# Patient Record
Sex: FEMALE | Race: WHITE | NOT HISPANIC OR LATINO | Employment: OTHER | URBAN - METROPOLITAN AREA
[De-identification: names, ages, dates, MRNs, and addresses within clinical notes are randomized per-mention and may not be internally consistent; named-entity substitution may affect disease eponyms.]

---

## 2018-03-06 ENCOUNTER — TELEPHONE (OUTPATIENT)
Dept: SPINE | Facility: CLINIC | Age: 58
End: 2018-03-06

## 2018-03-06 DIAGNOSIS — M41.9 SCOLIOSIS, UNSPECIFIED SCOLIOSIS TYPE, UNSPECIFIED SPINAL REGION: Primary | ICD-10-CM

## 2018-03-06 NOTE — PROGRESS NOTES
"Subjective:      Patient ID: Johanny Najera is a 57 y.o. female.    Chief Complaint: Scoliosis      HPI     History of fibromyalgia.     History of chronic intermittent LBP that has been worse in last 2 months.     She complains of constant LBP with bilateral posterior leg pain to her feet. LBP > leg pain. Left leg pain > right leg pain. No numbness. She has tingling and weakness in her legs. She walks like an "old lady." Leg pain is better with standing and walking. Some improvement in LBP with stretching and yoga. Pain is worse with prolonged sitting and standing. Pain is worse in morning with some improvement as day progresses. Pain is burning. She rates her pain as a 4 on a scale of 1-10. No known injury.     Some improvement with yoga on her own. No PT, injections, or surgery on her back. She is on zanaflex and lyrica for FM- not sure this helps her back. No relief with OTC tylenol.     Patient denies fevers, chills, night sweats, nausea, vomiting, and weight loss. Patient also denies bowel/bladder dysfunction, sexual dysfunction, and any saddle anesthesia. She denies balance issues, changes in handwriting, problems with hand dexterity, and frequent dropping of items.      Review of Systems   Constitution: Negative for fever, weakness, malaise/fatigue, night sweats, weight gain and weight loss.   HENT: Negative for hearing loss, nosebleeds and odynophagia.    Eyes: Negative for blurred vision and double vision.   Cardiovascular: Negative for chest pain, irregular heartbeat and palpitations.   Respiratory: Negative for cough, hemoptysis, shortness of breath and wheezing.    Endocrine: Negative for cold intolerance and polydipsia.   Hematologic/Lymphatic: Does not bruise/bleed easily.   Skin: Negative for dry skin, poor wound healing, rash and suspicious lesions.   Musculoskeletal: Positive for back pain and neck pain.        See HPI for pertinent positives.   Gastrointestinal: Negative for bloating, abdominal " pain, constipation, diarrhea, hematochezia, melena, nausea and vomiting.   Genitourinary: Negative for bladder incontinence, dysuria, hematuria, hesitancy and incomplete emptying.   Neurological: Positive for paresthesias. Negative for disturbances in coordination, dizziness, focal weakness, headaches, loss of balance, numbness and seizures.   Psychiatric/Behavioral: Positive for depression. Negative for hallucinations. The patient is nervous/anxious.            Objective:        General: Johanny is well-developed, well-nourished, appears stated age, in no acute distress, alert and oriented to time, place and person.     General    Vitals reviewed.  Constitutional: She is oriented to person, place, and time. She appears well-developed and well-nourished.   Pulmonary/Chest: Effort normal.   Abdominal: She exhibits no distension.   Neurological: She is alert and oriented to person, place, and time.   Psychiatric: She has a normal mood and affect. Her behavior is normal. Judgment and thought content normal.           Gait: normal, tandem walking is normal and she is able to heel/toe stand.     On exam of the thoracolumbar spine, Inspection of back is normal, diffuse lower lumbar tenderness.     Skin in thoracolumbar region is warm to the touch without visible rashes.     muscle tone normal without spasm, limited range of motion with pain  Pain in flexion.    Strength testing of the bilateral LEs shows  Right hip abduction:  +5/5  Left hip abduction:  +5/5  Right hip flexion:  +5/5   Left hip flexion:  +5/5  Right hip extensors:  +5/5  Left hip extensors:  +5/5  Right quadriceps:  +5/5  Left quadriceps:  +5/5  Right hamstring:  +5/5  Left hamstring:  +5/5  Right dorsiflexion:  +5/5  Left dorsiflexion:  +5/5  Right plantar flexion:  +5/5  Left plantar flexion:  +5/5   Right EHL:  +5/5   Left EHL:  +5/5    negative clonus of bilateral LEs.     negative straight leg raise on bilateral LEs.     DTRs:  Right patellar:  +2      Left patellar:  +2  Right achilles:  +2   Left achilles:  +2    Sensation is grossly intact in L2, L3, L4, L5, and S1 distribution.    Right hip has no pain with IR/ER. Left hip has no pain with IR/ER.     On exam of bilateral UEs, patient has full painfree ROM with no signs of clubbing, laxity, cyanosis, edema, instability, weakness, or tenderness.       XRAY INTERPRETATION:  Full length scoliosis x-rays (AP/lateral) dated 3/7/18 are personally reviewed and show right sided 21 degree curve T12-L5, DDD T12-L1, retrolisthesis L2-L3 with DDD, mild lumbar spondylosis.         Assessment:       1. Spondylosis without myelopathy    2. DDD (degenerative disc disease), lumbosacral    3. Thoracic and lumbosacral neuritis    4. Acquired spondylolisthesis    5. Chronic bilateral low back pain with bilateral sciatica           Plan:       Orders Placed This Encounter    MRI LUMBAR SPINE WITHOUT CONTRAST    meloxicam (MOBIC) 15 MG tablet       History of chronic intermittent LBP that has been worse in last 2 months. Now with constant LBP with bilateral posterior leg pain to her feet. LBP > leg pain. Left leg pain > right leg pain. Known right sided 21 degree curve T12-L5, DDD T12-L1, retrolisthesis L2-L3 with DDD, mild lumbar spondylosis. Pain likely due to underlying degeneration. She also has fibromyalgia with complicates her clinical picture. Treatment options reviewed with patient along with above scoliosis XRs. Following plan made:     - MRI of lumbar spine to further evaluate bilateral LE radiculitis.   - Continue with yoga twice a week (she just started last week).   - Continue with zanaflex and lyrica for FM.   - New prescription for mobic. Reviewed dosing and side effects. Take with food.   - Depending on results of MRI, consider PT and/or ESIs ([facility]).     Follow-up: Follow-up in 2 weeks (on 3/21/2018). If there are any questions prior to this, the patient was instructed to contact the office.

## 2018-03-07 ENCOUNTER — OFFICE VISIT (OUTPATIENT)
Dept: SPINE | Facility: CLINIC | Age: 58
End: 2018-03-07
Payer: COMMERCIAL

## 2018-03-07 ENCOUNTER — HOSPITAL ENCOUNTER (OUTPATIENT)
Dept: RADIOLOGY | Facility: OTHER | Age: 58
Discharge: HOME | End: 2018-03-07
Attending: PHYSICIAN ASSISTANT
Payer: COMMERCIAL

## 2018-03-07 VITALS
SYSTOLIC BLOOD PRESSURE: 123 MMHG | HEART RATE: 60 BPM | WEIGHT: 185 LBS | DIASTOLIC BLOOD PRESSURE: 72 MMHG | HEIGHT: 62 IN | BODY MASS INDEX: 34.04 KG/M2

## 2018-03-07 DIAGNOSIS — M54.42 CHRONIC BILATERAL LOW BACK PAIN WITH BILATERAL SCIATICA: ICD-10-CM

## 2018-03-07 DIAGNOSIS — M54.14 THORACIC AND LUMBOSACRAL NEURITIS: ICD-10-CM

## 2018-03-07 DIAGNOSIS — G89.29 CHRONIC BILATERAL LOW BACK PAIN WITH BILATERAL SCIATICA: ICD-10-CM

## 2018-03-07 DIAGNOSIS — M47.819 SPONDYLOSIS WITHOUT MYELOPATHY: ICD-10-CM

## 2018-03-07 DIAGNOSIS — M43.10 ACQUIRED SPONDYLOLISTHESIS: ICD-10-CM

## 2018-03-07 DIAGNOSIS — M41.9 SCOLIOSIS, UNSPECIFIED SCOLIOSIS TYPE, UNSPECIFIED SPINAL REGION: ICD-10-CM

## 2018-03-07 DIAGNOSIS — M54.41 CHRONIC BILATERAL LOW BACK PAIN WITH BILATERAL SCIATICA: ICD-10-CM

## 2018-03-07 DIAGNOSIS — M54.17 THORACIC AND LUMBOSACRAL NEURITIS: ICD-10-CM

## 2018-03-07 DIAGNOSIS — M51.37 DDD (DEGENERATIVE DISC DISEASE), LUMBOSACRAL: ICD-10-CM

## 2018-03-07 PROCEDURE — 99213 OFFICE O/P EST LOW 20 MIN: CPT | Mod: PBBFAC,25 | Performed by: PHYSICIAN ASSISTANT

## 2018-03-07 PROCEDURE — 99204 OFFICE O/P NEW MOD 45 MIN: CPT | Mod: S$GLB,,, | Performed by: PHYSICIAN ASSISTANT

## 2018-03-07 PROCEDURE — 99999 PR PBB SHADOW E&M-EST. PATIENT-LVL III: CPT | Mod: PBBFAC,,, | Performed by: PHYSICIAN ASSISTANT

## 2018-03-07 PROCEDURE — 72082 X-RAY EXAM ENTIRE SPI 2/3 VW: CPT | Mod: TC,FY

## 2018-03-07 PROCEDURE — 72082 X-RAY EXAM ENTIRE SPI 2/3 VW: CPT | Mod: 26,,, | Performed by: RADIOLOGY

## 2018-03-07 RX ORDER — MELOXICAM 15 MG/1
15 TABLET ORAL DAILY
Qty: 30 TABLET | Refills: 2 | Status: SHIPPED | OUTPATIENT
Start: 2018-03-07

## 2018-03-07 RX ORDER — PROGESTERONE 200 MG/1
200 CAPSULE ORAL DAILY
COMMUNITY
End: 2021-03-29

## 2018-03-07 RX ORDER — PREGABALIN 200 MG/1
200 CAPSULE ORAL 2 TIMES DAILY
Refills: 3 | Status: ON HOLD | COMMUNITY
Start: 2017-12-31 | End: 2020-01-20 | Stop reason: HOSPADM

## 2018-03-07 RX ORDER — THYROID, PORCINE 90 MG/1
1 TABLET ORAL EVERY MORNING
Refills: 5 | COMMUNITY
Start: 2018-02-20 | End: 2021-03-29

## 2018-03-07 RX ORDER — CITALOPRAM 20 MG/1
20 TABLET, FILM COATED ORAL DAILY
Refills: 3 | COMMUNITY
Start: 2018-01-28

## 2018-03-07 RX ORDER — FUROSEMIDE 20 MG/1
TABLET ORAL
Refills: 2 | COMMUNITY
Start: 2018-03-04

## 2018-03-07 RX ORDER — SIMVASTATIN 40 MG/1
40 TABLET, FILM COATED ORAL NIGHTLY
COMMUNITY
End: 2021-03-29

## 2018-03-07 RX ORDER — TIZANIDINE 2 MG/1
2 TABLET ORAL DAILY PRN
Refills: 3 | Status: ON HOLD | COMMUNITY
Start: 2018-02-21 | End: 2020-01-20 | Stop reason: HOSPADM

## 2018-03-12 ENCOUNTER — HOSPITAL ENCOUNTER (OUTPATIENT)
Dept: RADIOLOGY | Facility: HOSPITAL | Age: 58
Discharge: HOME | End: 2018-03-12
Attending: PHYSICIAN ASSISTANT
Payer: COMMERCIAL

## 2018-03-12 DIAGNOSIS — M47.819 SPONDYLOSIS WITHOUT MYELOPATHY: ICD-10-CM

## 2018-03-12 DIAGNOSIS — M54.14 THORACIC AND LUMBOSACRAL NEURITIS: ICD-10-CM

## 2018-03-12 DIAGNOSIS — M54.42 CHRONIC BILATERAL LOW BACK PAIN WITH BILATERAL SCIATICA: ICD-10-CM

## 2018-03-12 DIAGNOSIS — G89.29 CHRONIC BILATERAL LOW BACK PAIN WITH BILATERAL SCIATICA: ICD-10-CM

## 2018-03-12 DIAGNOSIS — M43.10 ACQUIRED SPONDYLOLISTHESIS: ICD-10-CM

## 2018-03-12 DIAGNOSIS — M54.41 CHRONIC BILATERAL LOW BACK PAIN WITH BILATERAL SCIATICA: ICD-10-CM

## 2018-03-12 DIAGNOSIS — M51.37 DDD (DEGENERATIVE DISC DISEASE), LUMBOSACRAL: ICD-10-CM

## 2018-03-12 DIAGNOSIS — M54.17 THORACIC AND LUMBOSACRAL NEURITIS: ICD-10-CM

## 2018-03-12 PROCEDURE — 72148 MRI LUMBAR SPINE W/O DYE: CPT | Mod: TC

## 2018-03-12 PROCEDURE — 72148 MRI LUMBAR SPINE W/O DYE: CPT | Mod: 26,,, | Performed by: RADIOLOGY

## 2018-03-28 ENCOUNTER — OFFICE VISIT (OUTPATIENT)
Dept: SPINE | Facility: CLINIC | Age: 58
End: 2018-03-28
Payer: COMMERCIAL

## 2018-03-28 VITALS
BODY MASS INDEX: 34.04 KG/M2 | WEIGHT: 185 LBS | HEART RATE: 68 BPM | DIASTOLIC BLOOD PRESSURE: 65 MMHG | SYSTOLIC BLOOD PRESSURE: 139 MMHG | HEIGHT: 62 IN

## 2018-03-28 DIAGNOSIS — M54.14 THORACIC AND LUMBOSACRAL NEURITIS: ICD-10-CM

## 2018-03-28 DIAGNOSIS — G89.29 CHRONIC BILATERAL LOW BACK PAIN WITH BILATERAL SCIATICA: ICD-10-CM

## 2018-03-28 DIAGNOSIS — M54.17 THORACIC AND LUMBOSACRAL NEURITIS: ICD-10-CM

## 2018-03-28 DIAGNOSIS — M41.9 SCOLIOSIS, UNSPECIFIED SCOLIOSIS TYPE, UNSPECIFIED SPINAL REGION: ICD-10-CM

## 2018-03-28 DIAGNOSIS — M51.37 DDD (DEGENERATIVE DISC DISEASE), LUMBOSACRAL: ICD-10-CM

## 2018-03-28 DIAGNOSIS — M54.42 CHRONIC BILATERAL LOW BACK PAIN WITH BILATERAL SCIATICA: ICD-10-CM

## 2018-03-28 DIAGNOSIS — M47.819 SPONDYLOSIS WITHOUT MYELOPATHY: Primary | ICD-10-CM

## 2018-03-28 DIAGNOSIS — M43.10 ACQUIRED SPONDYLOLISTHESIS: ICD-10-CM

## 2018-03-28 DIAGNOSIS — M48.061 FORAMINAL STENOSIS OF LUMBAR REGION: ICD-10-CM

## 2018-03-28 DIAGNOSIS — M54.41 CHRONIC BILATERAL LOW BACK PAIN WITH BILATERAL SCIATICA: ICD-10-CM

## 2018-03-28 PROCEDURE — 99214 OFFICE O/P EST MOD 30 MIN: CPT | Mod: PBBFAC | Performed by: PHYSICIAN ASSISTANT

## 2018-03-28 PROCEDURE — 99214 OFFICE O/P EST MOD 30 MIN: CPT | Mod: S$GLB,,, | Performed by: PHYSICIAN ASSISTANT

## 2018-03-28 PROCEDURE — 99999 PR PBB SHADOW E&M-EST. PATIENT-LVL IV: CPT | Mod: PBBFAC,,, | Performed by: PHYSICIAN ASSISTANT

## 2018-03-28 NOTE — PROGRESS NOTES
Subjective:      Patient ID: Johanny Najera is a 57 y.o. female.    Chief Complaint: Follow-up      HPI  ([place])    History of fibromyalgia.      Known right sided 21 degree curve T12-L5, DDD T12-L1, retrolisthesis L2-L3 with DDD, mild lumbar spondylosis. Pain likely due to underlying degeneration. She also has fibromyalgia with complicates her clinical picture.    Given mobic at last visit. Advised to continue lyrica and zanaflex. Here to review her lumbar MRI.     She continues with constant LBP with left > right posterior leg pain to her feet. LBP = left leg pain. No numbness. She has tingling and weakness in her legs. Some improvement in LBP with stretching and yoga. Pain is worse with prolonged sitting and standing. Pain is worse in morning with some improvement as day progresses. Pain is burning. She rates her pain as an 8 on a scale of 1-10. Has new pain in right shoulder blade that started this morning when she was getting dressed. TENs unit is helping. She is on zanaflex, lyrica, and mobic. Not sure mobic is helping.        Review of Systems   Constitution: Negative for chills, fever, night sweats and weight gain.   Gastrointestinal: Negative for bowel incontinence, nausea and vomiting.   Genitourinary: Negative for bladder incontinence.   Neurological: Negative for disturbances in coordination and loss of balance.           Objective:        General: Johanny is well-developed, well-nourished, appears stated age, in no acute distress, alert and oriented to time, place and person.     Ortho/SPM Exam     Patient sits comfortably in the exam room and answers questions appropriately. Grossly patient is able to move bilateral LEs without difficulty. Ambulates normally.     Mild tenderness medial border of right scapula.       XRAY INTERPRETATION:  MRI of lumbar spine dated 3/12/18 is personally reviewed and shows disc bulge/facet hypertrophy L4-L5 with mild right foraminal stenosis. Disc bulge/facet  hypertrophy L5-S1 with mild left foraminal stenosis.        Assessment:       1. Spondylosis without myelopathy    2. Chronic bilateral low back pain with bilateral sciatica    3. DDD (degenerative disc disease), lumbosacral    4. Scoliosis, unspecified scoliosis type, unspecified spinal region    5. Thoracic and lumbosacral neuritis    6. Acquired spondylolisthesis    7. Foraminal stenosis of lumbar region           Plan:       Orders Placed This Encounter    Ambulatory referral to Physical Therapy - Lumbar/Thoracic       History of chronic intermittent LBP that has been worse in last 2 months. Now with constant LBP with bilateral posterior leg pain to her feet. LBP > leg pain. Left leg pain > right leg pain. Known right sided 21 degree curve T12-L5, DDD T12-L1, retrolisthesis L2-L3 with DDD, mild right foraminal stenosis L4-L5 and mild left foraminal stenosis L5-S1. Pain likely due to underlying degeneration. She also has fibromyalgia with complicates her clinical picture. Right scapular pain appears more myofascial in nature. Treatment options reviewed with patient along with above scoliosis XRs. Following plan made:     - PT for TL spine with all modalities. External script given.   - Continue with yoga twice a week.   - Continue with zanaflex and lyrica for FM. Continue on mobic.   - If no improvement with above, consider injections with pain management at [facility] (left L5-S1 TF versus L5-S1 IL versus facet injections).   - She will email me with updated.     Follow-up: Follow-up if symptoms worsen or fail to improve. If there are any questions prior to this, the patient was instructed to contact the office.

## 2019-01-01 ENCOUNTER — HOSPITAL ENCOUNTER (EMERGENCY)
Facility: HOSPITAL | Age: 59
Discharge: HOME | End: 2019-01-01
Attending: SURGERY
Payer: COMMERCIAL

## 2019-01-01 VITALS
RESPIRATION RATE: 18 BRPM | WEIGHT: 191.81 LBS | SYSTOLIC BLOOD PRESSURE: 138 MMHG | DIASTOLIC BLOOD PRESSURE: 77 MMHG | BODY MASS INDEX: 35.08 KG/M2 | OXYGEN SATURATION: 100 % | TEMPERATURE: 98 F | HEART RATE: 77 BPM

## 2019-01-01 DIAGNOSIS — R07.89 CHEST PAIN, NON-CARDIAC: Primary | ICD-10-CM

## 2019-01-01 DIAGNOSIS — M54.9 BACK PAIN: ICD-10-CM

## 2019-01-01 DIAGNOSIS — R11.0 NAUSEA: ICD-10-CM

## 2019-01-01 LAB
ALBUMIN SERPL BCP-MCNC: 3.7 G/DL
ALP SERPL-CCNC: 103 U/L
ALT SERPL W/O P-5'-P-CCNC: 14 U/L
AMPHET+METHAMPHET UR QL: NEGATIVE
ANION GAP SERPL CALC-SCNC: 9 MMOL/L
APTT BLDCRRT: 23.9 SEC
AST SERPL-CCNC: 16 U/L
BARBITURATES UR QL SCN>200 NG/ML: NEGATIVE
BASOPHILS # BLD AUTO: 0.03 K/UL
BASOPHILS NFR BLD: 0.3 %
BENZODIAZ UR QL SCN>200 NG/ML: NEGATIVE
BILIRUB SERPL-MCNC: 0.5 MG/DL
BILIRUB UR QL STRIP: NEGATIVE
BNP SERPL-MCNC: 14 PG/ML
BUN SERPL-MCNC: 15 MG/DL
BZE UR QL SCN: NEGATIVE
CALCIUM SERPL-MCNC: 9.6 MG/DL
CANNABINOIDS UR QL SCN: NEGATIVE
CHLORIDE SERPL-SCNC: 99 MMOL/L
CK MB SERPL-MCNC: 1.2 NG/ML
CK MB SERPL-MCNC: 1.3 NG/ML
CK MB SERPL-RTO: 1.3 %
CK MB SERPL-RTO: 1.3 %
CK SERPL-CCNC: 92 U/L
CK SERPL-CCNC: 92 U/L
CK SERPL-CCNC: 98 U/L
CK SERPL-CCNC: 98 U/L
CLARITY UR: CLEAR
CO2 SERPL-SCNC: 30 MMOL/L
COLOR UR: YELLOW
CREAT SERPL-MCNC: 1 MG/DL
CREAT UR-MCNC: 19.7 MG/DL
D DIMER PPP IA.FEU-MCNC: 0.32 MG/L FEU
DIFFERENTIAL METHOD: ABNORMAL
EOSINOPHIL # BLD AUTO: 0.1 K/UL
EOSINOPHIL NFR BLD: 0.7 %
ERYTHROCYTE [DISTWIDTH] IN BLOOD BY AUTOMATED COUNT: 13.3 %
EST. GFR  (AFRICAN AMERICAN): >60 ML/MIN/1.73 M^2
EST. GFR  (NON AFRICAN AMERICAN): >60 ML/MIN/1.73 M^2
GLUCOSE SERPL-MCNC: 69 MG/DL
GLUCOSE UR QL STRIP: NEGATIVE
HCT VFR BLD AUTO: 40.3 %
HGB BLD-MCNC: 13.3 G/DL
HGB UR QL STRIP: ABNORMAL
INR PPP: 0.9
KETONES UR QL STRIP: NEGATIVE
LEUKOCYTE ESTERASE UR QL STRIP: NEGATIVE
LYMPHOCYTES # BLD AUTO: 2.1 K/UL
LYMPHOCYTES NFR BLD: 17.4 %
MAGNESIUM SERPL-MCNC: 2.5 MG/DL
MCH RBC QN AUTO: 30.9 PG
MCHC RBC AUTO-ENTMCNC: 33 G/DL
MCV RBC AUTO: 94 FL
METHADONE UR QL SCN>300 NG/ML: NEGATIVE
MICROSCOPIC COMMENT: ABNORMAL
MONOCYTES # BLD AUTO: 0.8 K/UL
MONOCYTES NFR BLD: 6.6 %
NEUTROPHILS # BLD AUTO: 8.8 K/UL
NEUTROPHILS NFR BLD: 75 %
NITRITE UR QL STRIP: NEGATIVE
OPIATES UR QL SCN: NEGATIVE
PCP UR QL SCN>25 NG/ML: NEGATIVE
PH UR STRIP: 6 [PH] (ref 5–8)
PHOSPHATE SERPL-MCNC: 3.1 MG/DL
PLATELET # BLD AUTO: 284 K/UL
PMV BLD AUTO: 9.9 FL
POTASSIUM SERPL-SCNC: 4 MMOL/L
PROT SERPL-MCNC: 7.5 G/DL
PROT UR QL STRIP: NEGATIVE
PROTHROMBIN TIME: 9.4 SEC
RBC # BLD AUTO: 4.31 M/UL
RBC #/AREA URNS HPF: 5 /HPF (ref 0–4)
SODIUM SERPL-SCNC: 138 MMOL/L
SP GR UR STRIP: 1.01 (ref 1–1.03)
TOXICOLOGY INFORMATION: NORMAL
TROPONIN I SERPL DL<=0.01 NG/ML-MCNC: <0.006 NG/ML
TROPONIN I SERPL DL<=0.01 NG/ML-MCNC: <0.006 NG/ML
TSH SERPL DL<=0.005 MIU/L-ACNC: 2.42 UIU/ML
URN SPEC COLLECT METH UR: ABNORMAL
UROBILINOGEN UR STRIP-ACNC: NEGATIVE EU/DL
WBC # BLD AUTO: 11.75 K/UL

## 2019-01-01 PROCEDURE — 85730 THROMBOPLASTIN TIME PARTIAL: CPT

## 2019-01-01 PROCEDURE — 83735 ASSAY OF MAGNESIUM: CPT

## 2019-01-01 PROCEDURE — 36415 COLL VENOUS BLD VENIPUNCTURE: CPT

## 2019-01-01 PROCEDURE — 84443 ASSAY THYROID STIM HORMONE: CPT

## 2019-01-01 PROCEDURE — 93005 ELECTROCARDIOGRAM TRACING: CPT

## 2019-01-01 PROCEDURE — 83880 ASSAY OF NATRIURETIC PEPTIDE: CPT

## 2019-01-01 PROCEDURE — 85610 PROTHROMBIN TIME: CPT

## 2019-01-01 PROCEDURE — 93010 ELECTROCARDIOGRAM REPORT: CPT | Mod: ,,, | Performed by: INTERNAL MEDICINE

## 2019-01-01 PROCEDURE — 82550 ASSAY OF CK (CPK): CPT | Mod: 91

## 2019-01-01 PROCEDURE — 99285 EMERGENCY DEPT VISIT HI MDM: CPT | Mod: 25

## 2019-01-01 PROCEDURE — 84100 ASSAY OF PHOSPHORUS: CPT

## 2019-01-01 PROCEDURE — 84484 ASSAY OF TROPONIN QUANT: CPT

## 2019-01-01 PROCEDURE — 93010 EKG 12-LEAD: ICD-10-PCS | Mod: ,,, | Performed by: INTERNAL MEDICINE

## 2019-01-01 PROCEDURE — 82553 CREATINE MB FRACTION: CPT | Mod: 91

## 2019-01-01 PROCEDURE — 85379 FIBRIN DEGRADATION QUANT: CPT

## 2019-01-01 PROCEDURE — 80053 COMPREHEN METABOLIC PANEL: CPT

## 2019-01-01 PROCEDURE — 85025 COMPLETE CBC W/AUTO DIFF WBC: CPT

## 2019-01-01 PROCEDURE — 80307 DRUG TEST PRSMV CHEM ANLYZR: CPT

## 2019-01-01 PROCEDURE — 81000 URINALYSIS NONAUTO W/SCOPE: CPT

## 2019-01-01 RX ORDER — NAPROXEN SODIUM 220 MG/1
81 TABLET, FILM COATED ORAL
Status: DISCONTINUED | OUTPATIENT
Start: 2019-01-01 | End: 2019-01-01 | Stop reason: HOSPADM

## 2019-01-01 RX ORDER — PANTOPRAZOLE SODIUM 40 MG/1
40 TABLET, DELAYED RELEASE ORAL DAILY
Qty: 30 TABLET | Refills: 0 | Status: SHIPPED | OUTPATIENT
Start: 2019-01-01 | End: 2021-03-29

## 2019-01-01 NOTE — DISCHARGE INSTRUCTIONS
Follow up with PCP in 24-48 hours. Return to ER with worsening of symptoms.     Our goal in the emergency department is to always give you outstanding care and exceptional service. You may receive a survey by mail or e-mail in the next week regarding your experience in our ED. We would greatly appreciate your completing and returning the survey. Your feedback provides us with a way to recognize our staff who give very good care and it helps us learn how to improve when your experience was below our aspiration of excellence.

## 2019-01-01 NOTE — ED PROVIDER NOTES
Encounter Date: 1/1/2019       History     Chief Complaint   Patient presents with    Muscle Pain     The history is provided by the patient.   Chest Pain   The current episode started 1 to 2 hours ago (11:30 AM). Episode Length: reports lasted about 1 min.  The chest pain is resolved. At its most intense, the chest pain is at 8/10. The chest pain is currently at 0/10. The quality of the pain is described as stabbing and tightness (stabbing to upper back between shoulder blades; tightness to anterior chest wall. ). The pain does not radiate. Pertinent negatives for primary symptoms include no fever, no fatigue, no syncope, no shortness of breath, no cough, no wheezing, no palpitations, no abdominal pain, no nausea, no vomiting, no dizziness and no altered mental status.   Pertinent negatives for associated symptoms include no claudication, no diaphoresis, no near-syncope, no numbness and no weakness. She tried aspirin (ASA 81 mg PTA) for the symptoms.   Pertinent negatives for past medical history include no CAD, no COPD, no CHF, no DVT, no hyperlipidemia, no hypertension, no PE and no strokes.   Pertinent negatives for family medical history include: no early MI.     Review of patient's allergies indicates:  No Known Allergies  Past Medical History:   Diagnosis Date    Fibromyalgia      Past Surgical History:   Procedure Laterality Date    GALLBLADDER SURGERY      HYSTERECTOMY       Family History   Problem Relation Age of Onset    Alzheimer's disease Mother     Cancer Father     COPD Father      Social History     Tobacco Use    Smoking status: Never Smoker    Smokeless tobacco: Never Used   Substance Use Topics    Alcohol use: No    Drug use: No     Review of Systems   Constitutional: Negative.  Negative for diaphoresis, fatigue and fever.   HENT: Negative.  Negative for congestion, ear discharge, ear pain, postnasal drip, rhinorrhea and sore throat.    Eyes: Negative.    Respiratory: Positive for  "chest tightness. Negative for cough, shortness of breath and wheezing.         Anterior CW tightness; denies sob or wheezing. Denies hx of cough.    Cardiovascular: Positive for chest pain. Negative for palpitations, claudication, syncope and near-syncope.        Anterior CW/substernal "tightness;" with stabbing pain to upper back between shoulder blades X 1.5 hrs ago, lasting approx. 1 min. Pain resolved now.    Gastrointestinal: Negative for abdominal pain, nausea and vomiting.   Endocrine: Negative.    Genitourinary: Negative.  Negative for dysuria.   Musculoskeletal: Negative.  Negative for arthralgias, back pain and myalgias.   Skin: Negative.  Negative for rash.   Neurological: Negative.  Negative for dizziness, weakness and numbness.   Hematological: Negative.  Does not bruise/bleed easily.   Psychiatric/Behavioral: Negative.        Physical Exam     Initial Vitals [01/01/19 1229]   BP Pulse Resp Temp SpO2   138/77 77 18 97.9 °F (36.6 °C) 100 %      MAP       --         Physical Exam    Nursing note and vitals reviewed.  Constitutional: She appears well-developed and well-nourished.   HENT:   Head: Normocephalic and atraumatic.   Eyes: Conjunctivae and EOM are normal. Pupils are equal, round, and reactive to light.   Neck: Neck supple.   Cardiovascular: Normal rate, regular rhythm, normal heart sounds and intact distal pulses.   Pulmonary/Chest: Breath sounds normal. No respiratory distress. She has no wheezes. She has no rhonchi. She exhibits no tenderness.   No CW TTP.    Abdominal: Soft. Bowel sounds are normal.   Musculoskeletal: Normal range of motion. She exhibits no tenderness.   Neurological: She is alert and oriented to person, place, and time. She has normal strength.   Skin: Skin is warm and dry.   Psychiatric: She has a normal mood and affect. Her behavior is normal. Judgment and thought content normal.         ED Course   Procedures  Labs Reviewed   COMPREHENSIVE METABOLIC PANEL - Abnormal; " Notable for the following components:       Result Value    CO2 30 (*)     Glucose 69 (*)     All other components within normal limits   CBC W/ AUTO DIFFERENTIAL - Abnormal; Notable for the following components:    Gran # (ANC) 8.8 (*)     Gran% 75.0 (*)     Lymph% 17.4 (*)     All other components within normal limits   URINALYSIS, REFLEX TO URINE CULTURE - Abnormal; Notable for the following components:    Occult Blood UA 1+ (*)     All other components within normal limits    Narrative:     Preferred Collection Type->Urine, Clean Catch   URINALYSIS MICROSCOPIC - Abnormal; Notable for the following components:    RBC, UA 5 (*)     All other components within normal limits    Narrative:     Preferred Collection Type->Urine, Clean Catch   TROPONIN I   CK   CK-MB   B-TYPE NATRIURETIC PEPTIDE   PROTIME-INR   APTT   D DIMER, QUANTITATIVE   DRUG SCREEN PANEL, URINE EMERGENCY    Narrative:     Preferred Collection Type->Urine, Clean Catch   PHOSPHORUS   MAGNESIUM   TSH          Imaging Results          X-Ray Chest 1 View (Final result)  Result time 01/01/19 13:31:59    Final result by Wyatt Kelly Jr., MD (01/01/19 13:31:59)                 Impression:      No acute abnormality.      Electronically signed by: Wyatt Kelly MD  Date:    01/01/2019  Time:    13:31             Narrative:    EXAMINATION:  XR CHEST 1 VIEW    CLINICAL HISTORY:  CP;    TECHNIQUE:  Single frontal view of the chest was performed.    COMPARISON:  None    FINDINGS:  The lungs are clear, with normal appearance of pulmonary vasculature and no pleural effusion or pneumothorax.    The cardiac silhouette is normal in size. The hilar and mediastinal contours are unremarkable.    Bones are intact.                                    Additional MDM:     Heart Failure Score:   COPD = No     1405- Updated on POC; will repeat CM.  1615-CM negative. Patient discharged with rx for protonix.     The patient acknowledges that close follow up with medical  provider is required. Instructed to follow up with PCP within 2 days. Patient was given specific return precautions. The patient agrees to comply with all instruction and directions given in the ER.                Clinical Impression:   The primary encounter diagnosis was Chest pain, non-cardiac. Diagnoses of Nausea and Back pain were also pertinent to this visit.      Disposition:   Disposition: Discharged  Condition: Stable                        Vickie Holman NP  01/01/19 2474

## 2019-01-01 NOTE — ED TRIAGE NOTES
Patient reports sharp back pain between shoulder blades that lasted 2 min. About 11:30, also felt slight epigastric pain at same time. Denies pain currently. Denies N/V. NADN.

## 2019-11-24 ENCOUNTER — OFFICE VISIT (OUTPATIENT)
Dept: URGENT CARE | Facility: CLINIC | Age: 59
End: 2019-11-24
Payer: COMMERCIAL

## 2019-11-24 VITALS
BODY MASS INDEX: 35.15 KG/M2 | HEIGHT: 62 IN | WEIGHT: 191 LBS | OXYGEN SATURATION: 98 % | HEART RATE: 62 BPM | SYSTOLIC BLOOD PRESSURE: 137 MMHG | DIASTOLIC BLOOD PRESSURE: 78 MMHG | TEMPERATURE: 98 F | RESPIRATION RATE: 20 BRPM

## 2019-11-24 DIAGNOSIS — J01.90 ACUTE SINUSITIS, RECURRENCE NOT SPECIFIED, UNSPECIFIED LOCATION: Primary | ICD-10-CM

## 2019-11-24 PROCEDURE — 3008F PR BODY MASS INDEX (BMI) DOCUMENTED: ICD-10-PCS | Mod: CPTII,S$GLB,, | Performed by: PHYSICIAN ASSISTANT

## 2019-11-24 PROCEDURE — 99203 OFFICE O/P NEW LOW 30 MIN: CPT | Mod: 25,S$GLB,, | Performed by: PHYSICIAN ASSISTANT

## 2019-11-24 PROCEDURE — 3008F BODY MASS INDEX DOCD: CPT | Mod: CPTII,S$GLB,, | Performed by: PHYSICIAN ASSISTANT

## 2019-11-24 PROCEDURE — 96372 THER/PROPH/DIAG INJ SC/IM: CPT | Mod: S$GLB,,, | Performed by: PHYSICIAN ASSISTANT

## 2019-11-24 PROCEDURE — 96372 PR INJECTION,THERAP/PROPH/DIAG2ST, IM OR SUBCUT: ICD-10-PCS | Mod: S$GLB,,, | Performed by: PHYSICIAN ASSISTANT

## 2019-11-24 PROCEDURE — 99203 PR OFFICE/OUTPT VISIT, NEW, LEVL III, 30-44 MIN: ICD-10-PCS | Mod: 25,S$GLB,, | Performed by: PHYSICIAN ASSISTANT

## 2019-11-24 RX ORDER — BETAMETHASONE SODIUM PHOSPHATE AND BETAMETHASONE ACETATE 3; 3 MG/ML; MG/ML
9 INJECTION, SUSPENSION INTRA-ARTICULAR; INTRALESIONAL; INTRAMUSCULAR; SOFT TISSUE ONCE
Status: COMPLETED | OUTPATIENT
Start: 2019-11-24 | End: 2019-11-24

## 2019-11-24 RX ORDER — PROMETHAZINE HYDROCHLORIDE AND DEXTROMETHORPHAN HYDROBROMIDE 6.25; 15 MG/5ML; MG/5ML
5 SYRUP ORAL
Qty: 118 ML | Refills: 0 | Status: SHIPPED | OUTPATIENT
Start: 2019-11-24 | End: 2019-12-01

## 2019-11-24 RX ORDER — AZELASTINE 1 MG/ML
1 SPRAY, METERED NASAL 2 TIMES DAILY
Qty: 30 ML | Refills: 0 | Status: SHIPPED | OUTPATIENT
Start: 2019-11-24 | End: 2019-12-24

## 2019-11-24 RX ORDER — CEFDINIR 300 MG/1
300 CAPSULE ORAL 2 TIMES DAILY
Qty: 20 CAPSULE | Refills: 0 | Status: SHIPPED | OUTPATIENT
Start: 2019-11-24 | End: 2019-12-04

## 2019-11-24 RX ADMIN — BETAMETHASONE SODIUM PHOSPHATE AND BETAMETHASONE ACETATE 9 MG: 3; 3 INJECTION, SUSPENSION INTRA-ARTICULAR; INTRALESIONAL; INTRAMUSCULAR; SOFT TISSUE at 01:11

## 2019-11-24 NOTE — PROGRESS NOTES
"Subjective:       Patient ID: Johanny Najera is a 59 y.o. female.    Vitals:  height is 5' 2" (1.575 m) and weight is 86.6 kg (191 lb). Her tympanic temperature is 97.9 °F (36.6 °C). Her blood pressure is 137/78 and her pulse is 62. Her respiration is 20 and oxygen saturation is 98%.     Chief Complaint: Sore Throat    Sore Throat    This is a new problem. The current episode started in the past 7 days. The problem has been gradually worsening. Neither side of throat is experiencing more pain than the other. There has been no fever. The pain is at a severity of 0/10. The patient is experiencing no pain. Associated symptoms include congestion and coughing. Pertinent negatives include no diarrhea, headaches, shortness of breath or vomiting. Treatments tried: allergy meds. The treatment provided mild relief.       Constitution: Negative for chills, fatigue and fever.   HENT: Positive for congestion and sore throat.    Neck: Negative for painful lymph nodes.   Cardiovascular: Negative for chest pain and leg swelling.   Eyes: Negative for double vision and blurred vision.   Respiratory: Positive for cough. Negative for shortness of breath.    Gastrointestinal: Negative for nausea, vomiting and diarrhea.   Genitourinary: Negative for dysuria, frequency, urgency and history of kidney stones.   Musculoskeletal: Negative for joint pain, joint swelling, muscle cramps and muscle ache.   Skin: Negative for color change, pale, rash and bruising.   Allergic/Immunologic: Negative for seasonal allergies.   Neurological: Negative for dizziness, history of vertigo, light-headedness, passing out and headaches.   Hematologic/Lymphatic: Negative for swollen lymph nodes.   Psychiatric/Behavioral: Negative for nervous/anxious, sleep disturbance and depression. The patient is not nervous/anxious.        Objective:      Physical Exam   Constitutional: She is oriented to person, place, and time. She appears well-developed and " well-nourished. She is cooperative.  Non-toxic appearance. She does not have a sickly appearance. She does not appear ill. No distress.   HENT:   Head: Normocephalic and atraumatic.   Right Ear: Hearing, external ear and ear canal normal. A middle ear effusion (serous) is present.   Left Ear: Hearing, external ear and ear canal normal. A middle ear effusion (serous) is present.   Nose: Mucosal edema (mild) present. No rhinorrhea or nasal deformity. No epistaxis. Right sinus exhibits no maxillary sinus tenderness and no frontal sinus tenderness. Left sinus exhibits no maxillary sinus tenderness and no frontal sinus tenderness.   Mouth/Throat: Uvula is midline, oropharynx is clear and moist and mucous membranes are normal. No trismus in the jaw. Normal dentition. No uvula swelling. No oropharyngeal exudate, posterior oropharyngeal edema or posterior oropharyngeal erythema.       Eyes: Conjunctivae and lids are normal. No scleral icterus.   Neck: Trachea normal, full passive range of motion without pain and phonation normal. Neck supple. No neck rigidity. No edema and no erythema present.   Cardiovascular: Normal rate, regular rhythm, normal heart sounds, intact distal pulses and normal pulses.   Pulmonary/Chest: Effort normal and breath sounds normal. No respiratory distress. She has no decreased breath sounds. She has no wheezes. She has no rhonchi.   Occasional cough   Abdominal: Normal appearance.   Musculoskeletal: Normal range of motion. She exhibits no edema or deformity.   Neurological: She is alert and oriented to person, place, and time. She exhibits normal muscle tone. Coordination normal.   Skin: Skin is warm, dry, intact, not diaphoretic and not pale.   Psychiatric: She has a normal mood and affect. Her speech is normal and behavior is normal. Judgment and thought content normal. Cognition and memory are normal.   Nursing note and vitals reviewed.        Assessment:       1. Acute sinusitis, recurrence not  specified, unspecified location        Plan:         Acute sinusitis, recurrence not specified, unspecified location  -     betamethasone acetate-betamethasone sodium phosphate injection 9 mg  -     promethazine-dextromethorphan (PROMETHAZINE-DM) 6.25-15 mg/5 mL Syrp; Take 5 mLs by mouth every 4 to 6 hours as needed.  Dispense: 118 mL; Refill: 0  -     azelastine (ASTELIN) 137 mcg (0.1 %) nasal spray; 1 spray (137 mcg total) by Nasal route 2 (two) times daily.  Dispense: 30 mL; Refill: 0  -     cefdinir (OMNICEF) 300 MG capsule; Take 1 capsule (300 mg total) by mouth 2 (two) times daily. for 10 days  Dispense: 20 capsule; Refill: 0      Patient Instructions   · Follow up with your primary care if symptoms do not improve, or you may return here at any time.  · If you were referred to a specialist, please follow up with that specialty.  · If you were prescribed antibiotics, please take them to completion.  · If you were prescribed a narcotic or any medication with sedative effects, do not drive or operate heavy equipment or machinery while taking these medications.  · You must understand that you have received treatment at an Urgent Care facility only, and that you may be released before all of your medical problems are known or treated. Urgent Care facilities are not equipped to handle life threatening emergencies. It is recommended that you seek care at an Emergency Department for further evaluation of worsening or concerning symptoms, or possibly life threatening conditions as discussed.                                        If you  smoke, please stop smoking        Wait and See Antibiotic:  You have been given a prescription for antibiotics. Your symptoms will likely resolve without antibiotics. It is recommended that you monitor yourself closely for 3-5 days and fill this prescription and start the antibiotic only if signs of infection, as discussed at your visit, are present. It is important to follow these  instructions due to the problem of increasing antibiotic resistance.        Over the counter and home treatment of symptoms:  Alternate tylenol and motrin every 3 hours  Salt water gargles  Cold-eeze helps to reduce the duration of sore throat symptoms  Cepachol helps to numb the discomfort  Chloroseptic spray  Nasal saline spray reduces inflammation and dryness  Warm face compresses as often as you can  Vicks vapor rub at night  Flonase OTC or Nasacort OTC  Simple foods like chicken noodle soup help  Pedialyte helps with dehydration if lacking appetite  Rest as much as you can

## 2020-01-12 PROBLEM — G93.40 ENCEPHALOPATHY: Status: ACTIVE | Noted: 2020-01-12

## 2020-01-13 PROBLEM — R41.82 ALTERED MENTAL STATUS: Status: ACTIVE | Noted: 2020-01-13

## 2020-01-14 PROBLEM — L03.90 CELLULITIS: Status: ACTIVE | Noted: 2020-01-14

## 2021-03-17 ENCOUNTER — OFFICE VISIT (OUTPATIENT)
Dept: URGENT CARE | Facility: CLINIC | Age: 61
End: 2021-03-17
Payer: COMMERCIAL

## 2021-03-17 VITALS
SYSTOLIC BLOOD PRESSURE: 137 MMHG | DIASTOLIC BLOOD PRESSURE: 75 MMHG | BODY MASS INDEX: 36.07 KG/M2 | TEMPERATURE: 97 F | HEART RATE: 62 BPM | WEIGHT: 196 LBS | OXYGEN SATURATION: 96 % | RESPIRATION RATE: 16 BRPM | HEIGHT: 62 IN

## 2021-03-17 DIAGNOSIS — R53.83 FATIGUE, UNSPECIFIED TYPE: ICD-10-CM

## 2021-03-17 DIAGNOSIS — B34.9 VIRAL SYNDROME: Primary | ICD-10-CM

## 2021-03-17 DIAGNOSIS — Z11.59 SCREENING FOR VIRAL DISEASE: ICD-10-CM

## 2021-03-17 LAB
BILIRUB UR QL STRIP: NEGATIVE
CTP QC/QA: YES
GLUCOSE UR QL STRIP: NEGATIVE
KETONES UR QL STRIP: NEGATIVE
LEUKOCYTE ESTERASE UR QL STRIP: NEGATIVE
MOLECULAR STREP A: NEGATIVE
PH, POC UA: 5.5 (ref 5–8)
POC BLOOD, URINE: POSITIVE
POC MOLECULAR INFLUENZA A AGN: NEGATIVE
POC MOLECULAR INFLUENZA B AGN: NEGATIVE
POC NITRATES, URINE: NEGATIVE
PROT UR QL STRIP: NEGATIVE
SARS-COV-2 RDRP RESP QL NAA+PROBE: NEGATIVE
SP GR UR STRIP: 1.01 (ref 1–1.03)
UROBILINOGEN UR STRIP-ACNC: NORMAL (ref 0.1–1.1)

## 2021-03-17 PROCEDURE — 87502 POCT INFLUENZA A/B MOLECULAR: ICD-10-PCS | Mod: QW,S$GLB,, | Performed by: NURSE PRACTITIONER

## 2021-03-17 PROCEDURE — 87651 POCT STREP A MOLECULAR: ICD-10-PCS | Mod: QW,S$GLB,, | Performed by: NURSE PRACTITIONER

## 2021-03-17 PROCEDURE — 87502 INFLUENZA DNA AMP PROBE: CPT | Mod: QW,S$GLB,, | Performed by: NURSE PRACTITIONER

## 2021-03-17 PROCEDURE — 99214 OFFICE O/P EST MOD 30 MIN: CPT | Mod: 25,S$GLB,, | Performed by: NURSE PRACTITIONER

## 2021-03-17 PROCEDURE — 81003 URINALYSIS AUTO W/O SCOPE: CPT | Mod: QW,S$GLB,, | Performed by: NURSE PRACTITIONER

## 2021-03-17 PROCEDURE — U0002: ICD-10-PCS | Mod: QW,S$GLB,, | Performed by: NURSE PRACTITIONER

## 2021-03-17 PROCEDURE — 93005 EKG 12-LEAD: ICD-10-PCS | Mod: S$GLB,,, | Performed by: NURSE PRACTITIONER

## 2021-03-17 PROCEDURE — 93005 ELECTROCARDIOGRAM TRACING: CPT | Mod: S$GLB,,, | Performed by: NURSE PRACTITIONER

## 2021-03-17 PROCEDURE — U0002 COVID-19 LAB TEST NON-CDC: HCPCS | Mod: QW,S$GLB,, | Performed by: NURSE PRACTITIONER

## 2021-03-17 PROCEDURE — 3008F PR BODY MASS INDEX (BMI) DOCUMENTED: ICD-10-PCS | Mod: CPTII,S$GLB,, | Performed by: NURSE PRACTITIONER

## 2021-03-17 PROCEDURE — 93010 ELECTROCARDIOGRAM REPORT: CPT | Mod: S$GLB,,, | Performed by: INTERNAL MEDICINE

## 2021-03-17 PROCEDURE — 81003 POCT URINALYSIS, DIPSTICK, AUTOMATED, W/O SCOPE: ICD-10-PCS | Mod: QW,S$GLB,, | Performed by: NURSE PRACTITIONER

## 2021-03-17 PROCEDURE — 3008F BODY MASS INDEX DOCD: CPT | Mod: CPTII,S$GLB,, | Performed by: NURSE PRACTITIONER

## 2021-03-17 PROCEDURE — 87651 STREP A DNA AMP PROBE: CPT | Mod: QW,S$GLB,, | Performed by: NURSE PRACTITIONER

## 2021-03-17 PROCEDURE — 93010 EKG 12-LEAD: ICD-10-PCS | Mod: S$GLB,,, | Performed by: INTERNAL MEDICINE

## 2021-03-17 PROCEDURE — 99214 PR OFFICE/OUTPT VISIT, EST, LEVL IV, 30-39 MIN: ICD-10-PCS | Mod: 25,S$GLB,, | Performed by: NURSE PRACTITIONER

## 2021-03-17 RX ORDER — TIZANIDINE HYDROCHLORIDE 2 MG/1
2 CAPSULE, GELATIN COATED ORAL NIGHTLY PRN
COMMUNITY

## 2021-03-17 RX ORDER — PREGABALIN 100 MG/1
200 CAPSULE ORAL 2 TIMES DAILY
COMMUNITY

## 2021-03-30 PROBLEM — R10.13 DISTRESS, EPIGASTRIC: Status: ACTIVE | Noted: 2021-03-30

## 2021-05-06 ENCOUNTER — PATIENT MESSAGE (OUTPATIENT)
Dept: RESEARCH | Facility: HOSPITAL | Age: 61
End: 2021-05-06

## 2021-05-10 ENCOUNTER — PATIENT MESSAGE (OUTPATIENT)
Dept: RESEARCH | Facility: HOSPITAL | Age: 61
End: 2021-05-10

## 2023-04-25 ENCOUNTER — HOSPITAL ENCOUNTER (EMERGENCY)
Facility: HOSPITAL | Age: 63
Discharge: HOME | End: 2023-04-25
Attending: STUDENT IN AN ORGANIZED HEALTH CARE EDUCATION/TRAINING PROGRAM
Payer: COMMERCIAL

## 2023-04-25 VITALS
RESPIRATION RATE: 18 BRPM | BODY MASS INDEX: 34.68 KG/M2 | SYSTOLIC BLOOD PRESSURE: 116 MMHG | DIASTOLIC BLOOD PRESSURE: 63 MMHG | HEART RATE: 74 BPM | TEMPERATURE: 98 F | WEIGHT: 189.63 LBS | OXYGEN SATURATION: 100 %

## 2023-04-25 DIAGNOSIS — K59.00 CONSTIPATION: ICD-10-CM

## 2023-04-25 DIAGNOSIS — K59.00 CONSTIPATION, UNSPECIFIED CONSTIPATION TYPE: Primary | ICD-10-CM

## 2023-04-25 PROCEDURE — 99283 EMERGENCY DEPT VISIT LOW MDM: CPT

## 2023-04-25 PROCEDURE — 25000003 PHARM REV CODE 250: Performed by: NURSE PRACTITIONER

## 2023-04-25 RX ORDER — LACTULOSE 10 G/15ML
10 SOLUTION ORAL 2 TIMES DAILY PRN
Qty: 60 ML | Refills: 0 | Status: SHIPPED | OUTPATIENT
Start: 2023-04-25

## 2023-04-25 RX ORDER — LACTULOSE 10 G/15ML
10 SOLUTION ORAL
Status: COMPLETED | OUTPATIENT
Start: 2023-04-25 | End: 2023-04-25

## 2023-04-25 RX ADMIN — LACTULOSE 10 G: 20 SOLUTION ORAL at 08:04

## 2023-04-26 NOTE — ED PROVIDER NOTES
Encounter Date: 4/25/2023       History     Chief Complaint   Patient presents with    Constipation     Patient reports she has been constipated for about 3 weeks, reports she has been having small BM since, also reports she has tried meds for this with no relief       Chief complaint:  Constipation  [age] year female presents the ED with intermittent constipation over the last 3 weeks.  Patient states she is been having small bowel movements she reports very small firm pebble like bowel movements. Denies in blood in her stool Denies any abdominal pain.  Reports some nausea with no vomiting She reports she tried taking over-the-counter medications such as MiraLax with minimal improvement in symptoms.      Review of patient's allergies indicates:  No Known Allergies  Past Medical History:   Diagnosis Date    Acalculous cholecystitis     Fibromyalgia     Gastroparesis     IBS (irritable bowel syndrome)     Internal hemorrhoids      Past Surgical History:   Procedure Laterality Date    COLONOSCOPY  03/17/2014    ESOPHAGOGASTRODUODENOSCOPY      ESOPHAGOGASTRODUODENOSCOPY N/A 03/30/2021    Procedure: EGD (ESOPHAGOGASTRODUODENOSCOPY);  Surgeon: Wyatt Barrow MD;  Location: [facility];  Service: Endoscopy;  Laterality: N/A;    FOOT SURGERY      GALLBLADDER SURGERY      HYSTERECTOMY       Family History   Problem Relation Age of Onset    Alzheimer's disease Mother     Cancer Father     COPD Father      Social History     Tobacco Use    Smoking status: Never    Smokeless tobacco: Never   Substance Use Topics    Alcohol use: Yes     Comment: occ    Drug use: No     Review of Systems   Respiratory:  Negative for shortness of breath.    Cardiovascular:  Negative for chest pain.   Gastrointestinal:  Positive for constipation. Negative for abdominal distention, blood in stool, diarrhea, nausea and vomiting.     Physical Exam     Initial Vitals [04/25/23 2000]   BP Pulse Resp Temp SpO2   119/69 77 18 97.6 °F (36.4 °C) 100 %       MAP       --         Physical Exam    Nursing note and vitals reviewed.  Constitutional: She appears well-developed and well-nourished.   Cardiovascular:  Normal rate.           Pulmonary/Chest: Breath sounds normal.   Abdominal: Abdomen is soft. She exhibits no distension and no mass. There is no abdominal tenderness. There is no rebound and no guarding.     Neurological: She is alert and oriented to person, place, and time.   Skin: Skin is warm and dry. Capillary refill takes less than 2 seconds.   Psychiatric: She has a normal mood and affect. Thought content normal.       ED Course   Procedures  Labs Reviewed - No data to display       Imaging Results              X-Ray Abdomen Flat And Erect (Final result)  Result time 04/25/23 20:30:42      Final result by Nathalie Fisher MD (04/25/23 20:30:42)                   Impression:      Nonspecific bowel gas pattern.      Electronically signed by: Nathalie Fisher  Date:    04/25/2023  Time:    20:30               Narrative:    EXAMINATION:  ABDOMEN FLAT AND ERECT    CLINICAL HISTORY:  Constipation, unspecified    TECHNIQUE:  Abdomen flat and erect radiographs were submitted.    COMPARISON:  03/10/2014    FINDINGS:  Abdomen flat and erect radiographs demonstrate a nonspecific bowel gas pattern.  There are no dilated loops of small bowel or air-fluid levels detected.  There is no free air detected under the diaphragm.  Moderate to moderately large fecal material is present.  Cholecystectomy clips are seen.  There is mild dextroscoliosis.                                       Medications   lactulose 20 gram/30 mL solution Soln 10 g (10 g Oral Given 4/25/23 2057)     Medical Decision Making:   Differential Diagnosis:   Constipation, bowel obstruction  ED Management:  Patient with soft abdomen today in ED with active bowel sounds throughout   KUB showed nonobstructive bowel gas pattern   Patient given lactulose will DC with Mag citrate  She was instructed to drink  plenty of fluids and follow up primary care given return precautions                        Clinical Impression:   Final diagnoses:  [K59.00] Constipation  [K59.00] Constipation, unspecified constipation type (Primary)        ED Disposition Condition    Discharge Stable          ED Prescriptions       Medication Sig Dispense Start Date End Date Auth. Provider    lactulose (CHRONULAC) 20 gram/30 mL Soln Take 15 mLs (10 g total) by mouth 2 (two) times daily as needed. 60 mL 4/25/2023 -- Paty Khan NP          Follow-up Information    None          Paty Khan NP  04/25/23 6010

## 2023-04-26 NOTE — DISCHARGE INSTRUCTIONS
Take lactulose as needed for constipation   Please drink plenty fluids   Please follow-up with primary care

## 2025-05-16 ENCOUNTER — HOSPITAL ENCOUNTER (EMERGENCY)
Facility: HOSPITAL | Age: 65
Discharge: HOME | End: 2025-05-16
Attending: SURGERY
Payer: COMMERCIAL

## 2025-05-16 VITALS
SYSTOLIC BLOOD PRESSURE: 135 MMHG | RESPIRATION RATE: 18 BRPM | DIASTOLIC BLOOD PRESSURE: 68 MMHG | HEIGHT: 62 IN | TEMPERATURE: 98 F | OXYGEN SATURATION: 97 % | WEIGHT: 193.44 LBS | BODY MASS INDEX: 35.6 KG/M2 | HEART RATE: 64 BPM

## 2025-05-16 DIAGNOSIS — M25.571 RIGHT ANKLE PAIN: Primary | ICD-10-CM

## 2025-05-16 DIAGNOSIS — M79.671 RIGHT FOOT PAIN: ICD-10-CM

## 2025-05-16 PROCEDURE — 99283 EMERGENCY DEPT VISIT LOW MDM: CPT | Mod: 25

## 2025-05-16 RX ORDER — IBUPROFEN 800 MG/1
800 TABLET, FILM COATED ORAL EVERY 6 HOURS PRN
Qty: 20 TABLET | Refills: 0 | Status: SHIPPED | OUTPATIENT
Start: 2025-05-16

## 2025-05-16 RX ORDER — CYCLOBENZAPRINE HCL 10 MG
10 TABLET ORAL 3 TIMES DAILY PRN
Qty: 10 TABLET | Refills: 0 | Status: SHIPPED | OUTPATIENT
Start: 2025-05-16 | End: 2025-05-21

## 2025-05-17 NOTE — ED PROVIDER NOTES
Encounter Date: 5/16/2025       History     Chief Complaint   Patient presents with    Foot Pain     Patient reports pain in right foot.  States she must have twisted it some kind of way.  Onset last night.     History of Present Illness  Johanny Najera is a 64 y.o. female that presents with right foot pain  Patient twisted her right foot yesterday with pain along the 1st metatarsal  Patient with no gross deformity, no bruising, no signs of fracture tonight  Patient with good distal pulses & capillary refill, neurovascular intact now  Patient reports no previous right foot injury or fracture on initial interview    The history is provided by the patient.     Review of patient's allergies indicates:  No Known Allergies    Past Medical History:   Diagnosis Date    Acalculous cholecystitis     Fibromyalgia     Gastroparesis     IBS (irritable bowel syndrome)     Internal hemorrhoids     Sleep apnea      Past Surgical History:   Procedure Laterality Date    COLONOSCOPY  03/17/2014    COLONOSCOPY N/A 5/28/2024    Procedure: COLONOSCOPY;  Surgeon: Wyatt Barrow MD;  Location: [facility];  Service: Endoscopy;  Laterality: N/A;    ESOPHAGOGASTRODUODENOSCOPY      ESOPHAGOGASTRODUODENOSCOPY N/A 03/30/2021    Procedure: EGD (ESOPHAGOGASTRODUODENOSCOPY);  Surgeon: Wyatt Barrow MD;  Location: [facility];  Service: Endoscopy;  Laterality: N/A;    FOOT SURGERY      GALLBLADDER SURGERY      HYSTERECTOMY       Family History   Problem Relation Name Age of Onset    Alzheimer's disease Mother      Heart disease Father      Cancer Father      COPD Father       Social History[1]    Review of Systems   Constitutional: Negative.    HENT: Negative.     Eyes: Negative.    Respiratory: Negative.     Cardiovascular: Negative.    Gastrointestinal: Negative.    Genitourinary: Negative.    Musculoskeletal:         (+) right foot pain   Skin: Negative.    Neurological: Negative.    Psychiatric/Behavioral: Negative.          Physical Exam     Initial Vitals [05/16/25 2129]   BP Pulse Resp Temp SpO2   137/73 65 16 98 °F (36.7 °C) 96 %      MAP       --         Physical Exam    Nursing note and vitals reviewed.  Constitutional: She appears well-developed.   HENT:   Head: Normocephalic and atraumatic.   Right Ear: External ear normal.   Left Ear: External ear normal.   Nose: Nose normal. Mouth/Throat: Oropharynx is clear and moist.   Eyes: Conjunctivae and EOM are normal. Pupils are equal, round, and reactive to light.   Neck: Neck supple. No JVD present.   Normal range of motion.  Cardiovascular:  Normal rate and regular rhythm.           Pulmonary/Chest: No respiratory distress. She has no wheezes. She has no rhonchi. She has no rales. She exhibits no tenderness.   Abdominal: Abdomen is soft. Bowel sounds are normal. She exhibits no distension. There is no abdominal tenderness. There is no rebound.   Musculoskeletal:         General: Normal range of motion.      Cervical back: Normal range of motion and neck supple.     Neurological: She is alert and oriented to person, place, and time. She has normal strength and normal reflexes.   Skin: Skin is warm and dry.         ED Course   Procedures  Labs Reviewed - No data to display       Imaging Results              X-Ray Foot Complete Right (Final result)  Result time 05/16/25 22:06:19      Final result by Mike George DO (05/16/25 22:06:19)                   Impression:      No acute fracture or dislocation of the ankle or foot.      Electronically signed by: Miek George  Date:    05/16/2025  Time:    22:06               Narrative:    EXAMINATION:  XR ANKLE COMPLETE 3 VIEW RIGHT; XR FOOT COMPLETE 3 VIEW RIGHT    CLINICAL HISTORY:  Pain in right ankle and joints of right foot; Pain in right foot    TECHNIQUE:  AP, lateral, and oblique views of the right ankle.    AP, lateral, and oblique views of the right foot.    COMPARISON:  None    FINDINGS:  Right ankle: No acute fracture  or dislocation. Alignment is normal. The ankle mortise is congruent. The talar dome is intact. Joint spaces are preserved. No effusion.    Right foot: No acute fracture or dislocation. Alignment is normal. The Lisfranc articulation is congruent. Joint spaces are preserved.  There are well corticated osseous densities within the distal Achilles tendon, along the dorsal aspect of the posterior calcaneus, may related to calcific tendinosis or enthesophytes.                                       X-Ray Ankle Complete Right (Final result)  Result time 05/16/25 22:06:19      Final result by Mike George DO (05/16/25 22:06:19)                   Impression:      No acute fracture or dislocation of the ankle or foot.      Electronically signed by: Mike George  Date:    05/16/2025  Time:    22:06               Narrative:    EXAMINATION:  XR ANKLE COMPLETE 3 VIEW RIGHT; XR FOOT COMPLETE 3 VIEW RIGHT    CLINICAL HISTORY:  Pain in right ankle and joints of right foot; Pain in right foot    TECHNIQUE:  AP, lateral, and oblique views of the right ankle.    AP, lateral, and oblique views of the right foot.    COMPARISON:  None    FINDINGS:  Right ankle: No acute fracture or dislocation. Alignment is normal. The ankle mortise is congruent. The talar dome is intact. Joint spaces are preserved. No effusion.    Right foot: No acute fracture or dislocation. Alignment is normal. The Lisfranc articulation is congruent. Joint spaces are preserved.  There are well corticated osseous densities within the distal Achilles tendon, along the dorsal aspect of the posterior calcaneus, may related to calcific tendinosis or enthesophytes.                                      Medical Decision Making  Differential includes sprain, strain, fracture, dislocation, ligament/tendon injury    Problems Addressed:  Right foot pain: complicated acute illness or injury    Amount and/or Complexity of Data Reviewed  Radiology: ordered and independent  interpretation performed.    ED Management & Risks of Complication, Morbidity, & Mortality:  Right foot & ankle x-ray showed no acute fracture  Patient declines pain medication in the emergency room  Patient states she has crutches at home on ER discharge  Nonweightbearing going forward until ortho evaluation  Ambulatory referral to orthopedics on ER discharge tonight  Nonnarcotic prescription of Motrin & Flexeril on discharge  Pt/Family counseled to return to the ER with any concerns on DC    Need for Hospitalization or Surgery with Social Determinants of Health:  This patient does not need to be hospitalized on ER evaluation today  The patient's diagnosis is not limited by social determinants of health  Does not require surgery or procedure (major or minor), no risk factors    Final diagnoses:  [M79.671] Right foot pain  [M25.571] Right ankle pain (Primary)          ED Disposition Condition    Discharge Stable          ED Prescriptions       Medication Sig Dispense Start Date End Date Auth. Provider    ibuprofen (ADVIL,MOTRIN) 800 MG tablet Take 1 tablet (800 mg total) by mouth every 6 (six) hours as needed for Pain. 20 tablet 5/16/2025 -- Neri Yeh MD    cyclobenzaprine (FLEXERIL) 10 MG tablet Take 1 tablet (10 mg total) by mouth 3 (three) times daily as needed for Muscle spasms. 10 tablet 5/16/2025 5/21/2025 Neri Yeh MD          Follow-up Information       Follow up With Specialties Details Why Contact Info    Darrin Muñoz MD Family Medicine Schedule an appointment as soon as possible for a visit in 2 days  [address] [facility] [address]  [phone]      Jacey Fraire PA-C Orthopedic Surgery Go in 2 days  [address] [place] [address]  [phone]                   [1]   Social History  Tobacco Use    Smoking status: Never    Smokeless tobacco: Never   Substance Use Topics    Alcohol use: Not Currently     Comment: occ    Drug use: No        Neri Yeh MD  05/16/25  [id number]

## 2025-05-19 ENCOUNTER — TELEPHONE (OUTPATIENT)
Dept: ORTHOPEDICS | Facility: CLINIC | Age: 65
End: 2025-05-19
Payer: COMMERCIAL

## 2025-05-19 NOTE — TELEPHONE ENCOUNTER
Contacted pt d/t WQ referral for Rt ankle pain.  Pt states she is feeling better and declines appt at this time.